# Patient Record
Sex: FEMALE | Race: WHITE | NOT HISPANIC OR LATINO | URBAN - METROPOLITAN AREA
[De-identification: names, ages, dates, MRNs, and addresses within clinical notes are randomized per-mention and may not be internally consistent; named-entity substitution may affect disease eponyms.]

---

## 2024-02-23 ENCOUNTER — OFFICE (OUTPATIENT)
Dept: URBAN - METROPOLITAN AREA CLINIC 11 | Facility: CLINIC | Age: 49
End: 2024-02-23

## 2024-02-23 VITALS
HEIGHT: 63 IN | OXYGEN SATURATION: 100 % | HEART RATE: 74 BPM | SYSTOLIC BLOOD PRESSURE: 129 MMHG | WEIGHT: 126 LBS | DIASTOLIC BLOOD PRESSURE: 86 MMHG

## 2024-02-23 DIAGNOSIS — K59.00 CONSTIPATION, UNSPECIFIED: ICD-10-CM

## 2024-02-23 DIAGNOSIS — R14.0 ABDOMINAL DISTENSION (GASEOUS): ICD-10-CM

## 2024-02-23 DIAGNOSIS — K21.9 GASTRO-ESOPHAGEAL REFLUX DISEASE WITHOUT ESOPHAGITIS: ICD-10-CM

## 2024-02-23 PROCEDURE — 99204 OFFICE O/P NEW MOD 45 MIN: CPT | Performed by: STUDENT IN AN ORGANIZED HEALTH CARE EDUCATION/TRAINING PROGRAM

## 2024-02-23 RX ORDER — LINACLOTIDE 145 UG/1
CAPSULE, GELATIN COATED ORAL
Qty: 90 | Refills: 3 | Status: COMPLETED
Start: 2024-02-23 | End: 2024-03-25

## 2024-02-23 RX ORDER — SODIUM PICOSULFATE, MAGNESIUM OXIDE, AND ANHYDROUS CITRIC ACID 10; 3.5; 12 MG/160ML; G/160ML; G/160ML
LIQUID ORAL
Qty: 350 | Refills: 0 | Status: COMPLETED
Start: 2024-02-23 | End: 2024-03-25

## 2024-02-23 NOTE — SERVICEHPINOTES
Ms. Sofía Villarreal is a 48-year-old white female with past medical history of chronic constipation, intermittent heartburn, who presents to [place] [id number] for screening colonoscopy and constipation.
br
br   clinic visit 02/23/2024: 
br patient reports that she has longstanding constipation for her entire life.  She has about 1-2 bowel movements per week.  She also has some hemorrhoids that flare from time to time.  She reports they are not too uncomfortable to her but she has had them as long as she is had children.  She has never had an EGD or colonoscopy.  She does not take any blood thinners or significant NSAIDs.  She does have intermittent reflux which she takes Prevacid for once every 2 weeks with good control.  She reports that usually dietary factors cause her to have reflux but she is not worried about it.  She has no alarm symptoms such as dysphagia or unintentional weight loss.  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  she has tried over-the-counter MiraLax and Metamucil fiber supplements in the past for her constipation which did not give her much relief.

## 2024-02-23 NOTE — SERVICENOTES
Patient is in need of screening colonoscopy so we will schedule her for that.  She has longstanding chronic constipation that is likely associated with CIC so will start her on Linzess on a medium dose since she has failed other conservative therapies such as Metamucil, MiraLax and over-the-counter supplements.  Will have her return to clinic in 4 months or sooner if needed.  I discussed with the patient the risks and benefits of the procedure including bleeding, infection, anesthesia related complications.  Patient agreed proceed  with the planned procedure.  All questions addressed.  High-fiber diet.  Avoid NSAIDs.  She can use antacids p.r.n. for intermittent reflux.

## 2024-03-25 ENCOUNTER — AMBULATORY SURGICAL CENTER (OUTPATIENT)
Dept: URBAN - METROPOLITAN AREA SURGERY 3 | Facility: SURGERY | Age: 49
End: 2024-03-25
Payer: COMMERCIAL

## 2024-03-25 ENCOUNTER — OFFICE (OUTPATIENT)
Dept: URBAN - METROPOLITAN AREA PATHOLOGY 12 | Facility: PATHOLOGY | Age: 49
End: 2024-03-25
Payer: COMMERCIAL

## 2024-03-25 VITALS
OXYGEN SATURATION: 99 % | DIASTOLIC BLOOD PRESSURE: 79 MMHG | HEART RATE: 80 BPM | DIASTOLIC BLOOD PRESSURE: 72 MMHG | RESPIRATION RATE: 18 BRPM | SYSTOLIC BLOOD PRESSURE: 136 MMHG | HEART RATE: 78 BPM | DIASTOLIC BLOOD PRESSURE: 91 MMHG | SYSTOLIC BLOOD PRESSURE: 136 MMHG | RESPIRATION RATE: 21 BRPM | TEMPERATURE: 98 F | DIASTOLIC BLOOD PRESSURE: 69 MMHG | SYSTOLIC BLOOD PRESSURE: 113 MMHG | TEMPERATURE: 98 F | SYSTOLIC BLOOD PRESSURE: 111 MMHG | HEART RATE: 80 BPM | RESPIRATION RATE: 18 BRPM | RESPIRATION RATE: 20 BRPM | HEART RATE: 72 BPM | WEIGHT: 124 LBS | SYSTOLIC BLOOD PRESSURE: 123 MMHG | DIASTOLIC BLOOD PRESSURE: 80 MMHG | SYSTOLIC BLOOD PRESSURE: 111 MMHG | DIASTOLIC BLOOD PRESSURE: 73 MMHG | OXYGEN SATURATION: 99 % | HEART RATE: 76 BPM | HEART RATE: 78 BPM | OXYGEN SATURATION: 100 % | HEART RATE: 76 BPM | TEMPERATURE: 98.4 F | SYSTOLIC BLOOD PRESSURE: 113 MMHG | DIASTOLIC BLOOD PRESSURE: 80 MMHG | TEMPERATURE: 98.4 F | HEIGHT: 63 IN | WEIGHT: 124 LBS | RESPIRATION RATE: 21 BRPM | OXYGEN SATURATION: 100 % | OXYGEN SATURATION: 100 % | HEART RATE: 78 BPM | SYSTOLIC BLOOD PRESSURE: 136 MMHG | TEMPERATURE: 98.4 F | RESPIRATION RATE: 18 BRPM | HEIGHT: 63 IN | DIASTOLIC BLOOD PRESSURE: 72 MMHG | HEART RATE: 72 BPM | RESPIRATION RATE: 20 BRPM | SYSTOLIC BLOOD PRESSURE: 115 MMHG | SYSTOLIC BLOOD PRESSURE: 123 MMHG | OXYGEN SATURATION: 99 % | RESPIRATION RATE: 20 BRPM | HEIGHT: 63 IN | RESPIRATION RATE: 16 BRPM | DIASTOLIC BLOOD PRESSURE: 79 MMHG | SYSTOLIC BLOOD PRESSURE: 111 MMHG | DIASTOLIC BLOOD PRESSURE: 73 MMHG | DIASTOLIC BLOOD PRESSURE: 91 MMHG | HEART RATE: 76 BPM | DIASTOLIC BLOOD PRESSURE: 91 MMHG | HEART RATE: 80 BPM | SYSTOLIC BLOOD PRESSURE: 113 MMHG | RESPIRATION RATE: 16 BRPM | WEIGHT: 124 LBS | SYSTOLIC BLOOD PRESSURE: 115 MMHG | SYSTOLIC BLOOD PRESSURE: 115 MMHG | DIASTOLIC BLOOD PRESSURE: 73 MMHG | DIASTOLIC BLOOD PRESSURE: 69 MMHG | DIASTOLIC BLOOD PRESSURE: 79 MMHG | HEART RATE: 72 BPM | DIASTOLIC BLOOD PRESSURE: 69 MMHG | SYSTOLIC BLOOD PRESSURE: 123 MMHG | RESPIRATION RATE: 16 BRPM | RESPIRATION RATE: 21 BRPM | DIASTOLIC BLOOD PRESSURE: 72 MMHG | DIASTOLIC BLOOD PRESSURE: 80 MMHG | TEMPERATURE: 98 F

## 2024-03-25 DIAGNOSIS — K63.5 POLYP OF COLON: ICD-10-CM

## 2024-03-25 DIAGNOSIS — Z12.11 ENCOUNTER FOR SCREENING FOR MALIGNANT NEOPLASM OF COLON: ICD-10-CM

## 2024-03-25 DIAGNOSIS — K62.1 RECTAL POLYP: ICD-10-CM

## 2024-03-25 PROCEDURE — 88305 TISSUE EXAM BY PATHOLOGIST: CPT | Performed by: PATHOLOGY

## 2024-03-25 PROCEDURE — 45380 COLONOSCOPY AND BIOPSY: CPT | Mod: 33 | Performed by: STUDENT IN AN ORGANIZED HEALTH CARE EDUCATION/TRAINING PROGRAM

## 2024-03-25 RX ORDER — PLECANATIDE 3 MG/1
3 TABLET ORAL
Qty: 90 | Refills: 0 | Status: COMPLETED
Start: 2024-03-25 | End: 2024-03-26

## 2024-03-25 RX ORDER — LINACLOTIDE 145 UG/1
CAPSULE, GELATIN COATED ORAL
Qty: 90 | Refills: 3 | Status: COMPLETED
Start: 2024-02-23 | End: 2024-03-25

## 2024-03-25 RX ORDER — LINACLOTIDE 145 UG/1
CAPSULE, GELATIN COATED ORAL
Qty: 24 | Refills: 0 | Status: COMPLETED
Start: 2024-02-23 | End: 2024-03-25

## 2024-03-25 NOTE — SERVICEHPINOTES
Ms. Sofía Villarreal is a 49-year-old white female with past medical history of chronic constipation, intermittent heartburn, who initially presented to [place] [address] for screening colonoscopy and constipation.clinic visit 02/23/2024:brpatient reports that she has longstanding constipation for her entire life.  She has about 1-2 bowel movements per week.  She also has some hemorrhoids that flare from time to time.  She reports they are not too uncomfortable to her but she has had them as long as she is had children.  She has never had an EGD or colonoscopy.  She does not take any blood thinners or significant NSAIDs.  She does have intermittent reflux which she takes Prevacid for once every 2 weeks with good control.  She reports that usually dietary factors cause her to have reflux but she is not worried about it.  She has no alarm symptoms such as dysphagia or unintentional weight loss.  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  she has tried over-the-counter MiraLax and Metamucil fiber supplements in the past for her constipation which did not give her much relief. 
br
br   Colonoscopy 3/25/24:  
br
PSH includes 3 c sections, not on blood thinners, no FHx of colon cancer, last BM was clear.

## 2024-03-25 NOTE — SERVICEHPINOTES
Ms. Sofía Villarreal is a 49-year-old white female with past medical history of chronic constipation, intermittent heartburn, who initially presented to [place] [address] for screening colonoscopy and constipation.clinic visit 02/23/2024:brpatient reports that she has longstanding constipation for her entire life.  She has about 1-2 bowel movements per week.  She also has some hemorrhoids that flare from time to time.  She reports they are not too uncomfortable to her but she has had them as long as she is had children.  She has never had an EGD or colonoscopy.  She does not take any blood thinners or significant NSAIDs.  She does have intermittent reflux which she takes Prevacid for once every 2 weeks with good control.  She reports that usually dietary factors cause her to have reflux but she is not worried about it.  She has no alarm symptoms such as dysphagia or unintentional weight loss.  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  she has tried over-the-counter MiraLax and Metamucil fiber supplements in the past for her constipation which did not give her much relief. 
br
br   Colonoscopy 3/25/24:  
br
PSH includes 3 c sections, not on blood thinners, no FHx of colon cancer, last BM was clear.

## 2024-03-25 NOTE — SERVICEHPINOTES
Ms. Sofía Villarreal is a 49-year-old white female with past medical history of chronic constipation, intermittent heartburn, who initially presented to [place] [id number] for screening colonoscopy and constipation.clinic visit 02/23/2024:brpatient reports that she has longstanding constipation for her entire life.  She has about 1-2 bowel movements per week.  She also has some hemorrhoids that flare from time to time.  She reports they are not too uncomfortable to her but she has had them as long as she is had children.  She has never had an EGD or colonoscopy.  She does not take any blood thinners or significant NSAIDs.  She does have intermittent reflux which she takes Prevacid for once every 2 weeks with good control.  She reports that usually dietary factors cause her to have reflux but she is not worried about it.  She has no alarm symptoms such as dysphagia or unintentional weight loss.  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  she has tried over-the-counter MiraLax and Metamucil fiber supplements in the past for her constipation which did not give her much relief. 
br
br   Colonoscopy 3/25/24:  
br
PSH includes 3 c sections, not on blood thinners, no FHx of colon cancer, last BM was clear.

## 2024-03-27 LAB
GASTRO ONE PATHOLOGY: PDF REPORT: (no result)

## 2024-07-24 ENCOUNTER — OFFICE (OUTPATIENT)
Dept: URBAN - METROPOLITAN AREA CLINIC 11 | Facility: CLINIC | Age: 49
End: 2024-07-24

## 2024-07-24 VITALS
DIASTOLIC BLOOD PRESSURE: 82 MMHG | HEIGHT: 63 IN | SYSTOLIC BLOOD PRESSURE: 119 MMHG | WEIGHT: 126 LBS | HEART RATE: 80 BPM | OXYGEN SATURATION: 96 %

## 2024-07-24 DIAGNOSIS — K21.9 GASTRO-ESOPHAGEAL REFLUX DISEASE WITHOUT ESOPHAGITIS: ICD-10-CM

## 2024-07-24 DIAGNOSIS — R14.0 ABDOMINAL DISTENSION (GASEOUS): ICD-10-CM

## 2024-07-24 DIAGNOSIS — K59.00 CONSTIPATION, UNSPECIFIED: ICD-10-CM

## 2024-07-24 PROCEDURE — 99214 OFFICE O/P EST MOD 30 MIN: CPT | Performed by: STUDENT IN AN ORGANIZED HEALTH CARE EDUCATION/TRAINING PROGRAM

## 2024-07-24 RX ORDER — LUBIPROSTONE 8 UG/1
CAPSULE, GELATIN COATED ORAL
Qty: 120 | Refills: 3 | Status: ACTIVE
Start: 2024-03-26

## 2024-07-24 NOTE — SERVICENOTES
patient is still having significant bloating and her symptoms are improved but not optimal yet.  She is interested in increasing her dose of the lubiprostone to see if this helps.  Will increased her to 16 mcg b.i.d. and see how she feels.  Return to clinic in 4 months or sooner if needed.  She can continue MiraLax twice a day or hold it and see how she does.  Recall colonoscopy is set.  She has failed true [place] and Linzess.

## 2024-07-24 NOTE — SERVICEHPINOTES
Ms. Soífa Villarreal is a 49-year-old white female with past medical history of chronic constipation, intermittent heartburn, who initially presented to [place] [address] for screening colonoscopy and constipation.clinic visit 02/23/2024:brpatient reports that she has longstanding constipation for her entire life.  She has about 1-2 bowel movements per week.  She also has some hemorrhoids that flare from time to time.  She reports they are not too uncomfortable to her but she has had them as long as she is had children.  She has never had an EGD or colonoscopy.  She does not take any blood thinners or significant NSAIDs.  She does have intermittent reflux which she takes Prevacid for once every 2 weeks with good control.  She reports that usually dietary factors cause her to have reflux but she is not worried about it.  She has no alarm symptoms such as dysphagia or unintentional weight loss.  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  she has tried over-the-counter MiraLax and Metamucil fiber supplements in the past for her constipation which did not give her much relief.Colonoscopy 3/25/24:brPS includes 3 c sections, not on blood thinners, no FHx of colon cancer, last BM was clear. 
br
br    clinic visit 07/24/2024: 
br patient reports that since starting the lubiprostone 8 mcg twice a day her symptoms are improved but she feels like she still has some breakthrough bloating and constipation.  She has 1 bowel movement about every 3 days.  She feels like she is constipated for about 2 days at a time and feels like things build up and then she will 2 days of watery diarrhea.  She is on MiraLax twice a day as well.  Her colonoscopy showed no polyps and repeat was recommended in 10 years.  She is well controlled on her current acid reflux medication.  No bleeding symptoms.  She reports her symptoms are worse when she travels.  we tried prescribing true Austin but this was too expensive for her and Linzess 145 caused her to just have copious watery diarrhea so she wanted to try something else.

## 2025-08-20 ENCOUNTER — OFFICE (OUTPATIENT)
Dept: URBAN - METROPOLITAN AREA CLINIC 11 | Facility: CLINIC | Age: 50
End: 2025-08-20
Payer: COMMERCIAL

## 2025-08-20 VITALS
HEIGHT: 63 IN | OXYGEN SATURATION: 98 % | HEART RATE: 82 BPM | SYSTOLIC BLOOD PRESSURE: 149 MMHG | DIASTOLIC BLOOD PRESSURE: 94 MMHG | WEIGHT: 128 LBS

## 2025-08-20 DIAGNOSIS — K59.00 CONSTIPATION, UNSPECIFIED: ICD-10-CM

## 2025-08-20 DIAGNOSIS — R14.0 ABDOMINAL DISTENSION (GASEOUS): ICD-10-CM

## 2025-08-20 DIAGNOSIS — K21.9 GASTRO-ESOPHAGEAL REFLUX DISEASE WITHOUT ESOPHAGITIS: ICD-10-CM

## 2025-08-20 PROCEDURE — 99214 OFFICE O/P EST MOD 30 MIN: CPT

## 2025-08-20 RX ORDER — OMEPRAZOLE 20 MG/1
20 CAPSULE, DELAYED RELEASE ORAL
Qty: 90 | Refills: 3 | Status: ACTIVE

## 2025-08-20 RX ORDER — LANSOPRAZOLE 15 MG/1
15 CAPSULE, DELAYED RELEASE ORAL
Refills: 0 | Status: COMPLETED
End: 2025-08-20

## 2025-08-20 RX ORDER — LUBIPROSTONE 24 UG/1
CAPSULE, GELATIN COATED ORAL
Qty: 180 | Refills: 2 | Status: ACTIVE
Start: 2024-03-26

## 2025-08-20 RX ORDER — PEPPERMINT OIL
OIL (ML) MISCELLANEOUS
Qty: 12 | Refills: 0 | Status: ACTIVE
Start: 2025-08-20